# Patient Record
Sex: FEMALE | Race: WHITE
[De-identification: names, ages, dates, MRNs, and addresses within clinical notes are randomized per-mention and may not be internally consistent; named-entity substitution may affect disease eponyms.]

---

## 2019-01-21 ENCOUNTER — HOSPITAL ENCOUNTER (INPATIENT)
Dept: HOSPITAL 10 - L-D | Age: 24
LOS: 3 days | Discharge: HOME | End: 2019-01-24
Attending: OBSTETRICS & GYNECOLOGY | Admitting: OBSTETRICS & GYNECOLOGY
Payer: COMMERCIAL

## 2019-01-21 ENCOUNTER — HOSPITAL ENCOUNTER (INPATIENT)
Dept: HOSPITAL 91 - PP1 | Age: 24
LOS: 3 days | Discharge: HOME | End: 2019-01-24
Payer: COMMERCIAL

## 2019-01-21 DIAGNOSIS — D63.8: ICD-10-CM

## 2019-01-21 DIAGNOSIS — Z3A.40: ICD-10-CM

## 2019-01-21 DIAGNOSIS — O48.0: Primary | ICD-10-CM

## 2019-01-21 LAB
ADD MAN DIFF?: NO
BASOPHIL #: 0.1 10^3/UL (ref 0–0.1)
BASOPHILS %: 0.5 % (ref 0–2)
EOSINOPHILS #: 0.1 10^3/UL (ref 0–0.5)
EOSINOPHILS %: 0.6 % (ref 0–7)
HEMATOCRIT: 33.3 % (ref 37–47)
HEMOGLOBIN: 10.7 G/DL (ref 12–16)
IMMATURE GRANS #M: 0.08 10^3/UL (ref 0–0.03)
IMMATURE GRANS % (M): 0.7 % (ref 0–0.43)
INR: 0.84
LYMPHOCYTES #: 1.9 10^3/UL (ref 0.8–2.9)
LYMPHOCYTES %: 17.1 % (ref 15–51)
MEAN CORPUSCULAR HEMOGLOBIN: 25.9 PG (ref 29–33)
MEAN CORPUSCULAR HGB CONC: 32.1 G/DL (ref 32–37)
MEAN CORPUSCULAR VOLUME: 80.6 FL (ref 82–101)
MEAN PLATELET VOLUME: 11.4 FL (ref 7.4–10.4)
MONOCYTE #: 1 10^3/UL (ref 0.3–0.9)
MONOCYTES %: 9.1 % (ref 0–11)
NEUTROPHIL #: 8 10^3/UL (ref 1.6–7.5)
NEUTROPHILS %: 72 % (ref 39–77)
NUCLEATED RED BLOOD CELLS #: 0 10^3/UL (ref 0–0)
NUCLEATED RED BLOOD CELLS%: 0 /100WBC (ref 0–0)
PARTIAL THROMBOPLASTIN TIME: 25.2 SEC (ref 23–35)
PLATELET COUNT: 306 10^3/UL (ref 140–415)
PROTIME: 11.6 SEC (ref 11.9–14.9)
PT RATIO: 0.9
RAPID PLASMA REAGIN: NONREACTIVE
RED BLOOD COUNT: 4.13 10^6/UL (ref 4.2–5.4)
RED CELL DISTRIBUTION WIDTH: 13.3 % (ref 11.5–14.5)
WHITE BLOOD COUNT: 11.1 10^3/UL (ref 4.8–10.8)

## 2019-01-21 PROCEDURE — 85610 PROTHROMBIN TIME: CPT

## 2019-01-21 PROCEDURE — 90686 IIV4 VACC NO PRSV 0.5 ML IM: CPT

## 2019-01-21 PROCEDURE — 86900 BLOOD TYPING SEROLOGIC ABO: CPT

## 2019-01-21 PROCEDURE — 86901 BLOOD TYPING SEROLOGIC RH(D): CPT

## 2019-01-21 PROCEDURE — 36430 TRANSFUSION BLD/BLD COMPNT: CPT

## 2019-01-21 PROCEDURE — 85730 THROMBOPLASTIN TIME PARTIAL: CPT

## 2019-01-21 PROCEDURE — P9016 RBC LEUKOCYTES REDUCED: HCPCS

## 2019-01-21 PROCEDURE — 86592 SYPHILIS TEST NON-TREP QUAL: CPT

## 2019-01-21 PROCEDURE — 80048 BASIC METABOLIC PNL TOTAL CA: CPT

## 2019-01-21 PROCEDURE — 80307 DRUG TEST PRSMV CHEM ANLYZR: CPT

## 2019-01-21 PROCEDURE — 86850 RBC ANTIBODY SCREEN: CPT

## 2019-01-21 PROCEDURE — 3E033VJ INTRODUCTION OF OTHER HORMONE INTO PERIPHERAL VEIN, PERCUTANEOUS APPROACH: ICD-10-PCS

## 2019-01-21 PROCEDURE — 85025 COMPLETE CBC W/AUTO DIFF WBC: CPT

## 2019-01-21 PROCEDURE — 86920 COMPATIBILITY TEST SPIN: CPT

## 2019-01-21 PROCEDURE — 3E033VJ INTRODUCTION OF OTHER HORMONE INTO PERIPHERAL VEIN, PERCUTANEOUS APPROACH: ICD-10-PCS | Performed by: OBSTETRICS & GYNECOLOGY

## 2019-01-21 RX ADMIN — Medication 1 MLS/HR: at 23:35

## 2019-01-21 RX ADMIN — SODIUM CITRATE AND CITRIC ACID MONOHYDRATE 1 ML: 500; 334 SOLUTION ORAL at 21:30

## 2019-01-21 NOTE — PREOPHP
DATE OF ADMISSION: 2019

 

HISTORY OF PRESENT ILLNESS:  This is a 23-year-old lady,  1, EDC 

2019 at 40 weeks pregnant, admitted in early labor.  She had prenatal care

in my [place] office and the prenatal care was uncomplicated except that she has

a history of mitral valve prolapse and her cardiology cleared her to have 

 section.   She advised her not to go for vaginal delivery and the 

patient wanted to have a  anyway.  So the procedures were explained to 

the patient and she understood everything totally.  The risks, benefits, and 

alternatives were discussed with her as well.

 

PAST PERSONAL HISTORY:  No history of TB, asthma.

 

ALLERGIES:  NO ALLERGIES.

 

SOCIAL HISTORY:  The patient does not smoke.  She does not drink.

 

MEDICATIONS:  She does not take any drugs except her iron and vitamins.

 

GYNECOLOGIC HISTORY:  She had menarche at the age of 11, every 28 days interval,

3 to 4 days duration, and moderate in amount.

 

FAMILY HISTORY:  Noncontributory.

 

REVIEW OF SYSTEMS:

CARDIOVASCULAR:  No chest pains.

RESPIRATORY:  No cough.

GASTROINTESTINAL:  No diarrhea, no vomiting.

GENITOURINARY:  No dysuria.

 

PHYSICAL EXAMINATION:

GENERAL:  Reveals a conscious, coherent lady and in no acute distress.

VITAL SIGNS:  Her blood pressure 120/80, pulse rate 80 per minute, respirations 

16 per minute.

BREASTS, HEART AND LUNGS:  Within normal limits.

ABDOMEN:  Term size uterus, estimated fetal weight about 8 pounds.

PELVIC:  Revealed the cervix to be 2 cm dilated and thick station -3 in cephalic

presentation with the bag of water intact.

EXTREMITIES:  No pedal edema.

 

ADMITTING DIAGNOSIS:  This is a 40 weeks intrauterine pregnancy in early labor. 

The patient was planned to have a .  Procedures were explained to her 

and she understood everything totally.  The risks, benefits and alternatives 

were discussed with her as well.

 

 

Dictated By: RD ALLEN MD

 

NS/NTS

DD:    2019 21:31:00

DT:    2019 22:01:13

Conf#: [id number]

DID#:  [id number]

CC: RD ALLEN MD;*EndCC*

MTDD

## 2019-01-21 NOTE — PREAC
Date/Time of Note


Date/Time of Note


DATE: 1/21/19 


TIME: 21:13





Anesthesia Eval and Record


Evaluation


Time Pre-Procedure Interview


DATE: 1/21/19 


TIME: 21:13


Age


23


Sex


female


NPO:  8 hrs


Preoperative diagnosis


40 week IUP


Planned procedure


c section





Past Medical History


Past Medical History:  Includes


Cardio:  Other (MVP)





Surgery & Anesthesia Issues


No known issue





Meds


Anticoagulation:  No


Beta Blocker within 24 hr:  No


Reason Beta Blocker not given:  Pt. not on B-Blocker





Current Medications


Cefazolin Sodium/ Dextrose 50 ml @  100 mls/hr ONCE IVPB ;  Start 1/21/19 at 


17:00


Oxytocin/Lactated Ringer's 500 ml @  125 mls/hr POST PARTUM IV ;  Start 1/21/19 


at 17:00


Oxytocin/Lactated Ringer's 500 ml @ 0 mls/hr ONCE PRN IV VAGINAL BLEEDING;  


Start 1/21/19 at 17:00


Methylergonovine Maleate (Methergine) 0.2 mg ONCE PRN IM VAGINAL BLEEDING;  


Start 1/21/19 at 17:00


Carboprost Tromethamine (Hemabate) 250 mcg ONCE PRN IM VAGINAL BLEEDING;  Start 


1/21/19 at 17:00


Misoprostol (Cytotec) 1,000 mcg ONCE PRN MI VAGINAL BLEEDING;  Start 1/21/19 at 


17:00


Meds reviewed:  Yes





Allergies


Coded Allergies:  


     No Known Allergies (Verified  Allergy, Unknown, 1/21/19)


Allergies Reviewed:  Yes





Labs/Studies


Labs Reviewed:  Reviewed by anesthesiologist


Result Diagram:  


1/21/19 1715





Laboratory Tests


1/21/19 17:15











Blood Bank


                  Test
                         1/21/19
17:15


                  Antibody Screen               NEGATIVE


                  Blood Type                    A POSITIVE


                  Rh Immune Globulin Candidate  NO





Pregnancy test:  Positive


Studies:  ECG (n/a), CXR (n/a)





Pre-procedure Exam


Airway:  Adequate mouth opening


Mallampati:  Mallampati I


Teeth:  Normal


Lung:  Normal


Heart:  Normal





ASA Physical Status


ASA physical status:  2


Emergency:  None





Planned Anesthetic


Neuraxial:  Spinal





Planned Pain Management


Sub-arachniod narcotics





Pre-operative Attestations


Prior to commencing anesthesia and surgery, the patient was re-evaluated, there 


was verification of:


*The patient's identity


*The results of appropriate recent lab work and preoperative vital signs


*The above evaluation not changing prior to induction


*Anesthetic plan, risk benefits, alternative and complications discussed with 


patient/family; questions answered; patient/family understands, accepts and 


wishes to proceed.











GREG MCDANIELS MD            Jan 21, 2019 21:17

## 2019-01-21 NOTE — PAC
Date/Time of Note


Date/Time of Note


DATE: 1/21/19 


TIME: 23:01





Post-Anesthesia Notes


Post-Anesthesia Note


Last documented vital signs


BP 98/60 HR75, Sat 98%, RR18 temp 98


Activity:  WNL


Respiratory function:  WNL


Cardiovascular function:  WNL


Mental status:  Baseline


Pain reasonably controlled:  Yes


Hydration appropriate:  Yes


Nausea/Vomiting absent:  No











GREG MCDANIELS MD            Jan 21, 2019 23:02

## 2019-01-22 VITALS — SYSTOLIC BLOOD PRESSURE: 123 MMHG | DIASTOLIC BLOOD PRESSURE: 71 MMHG | RESPIRATION RATE: 18 BRPM | HEART RATE: 90 BPM

## 2019-01-22 VITALS — RESPIRATION RATE: 18 BRPM | HEART RATE: 72 BPM | SYSTOLIC BLOOD PRESSURE: 118 MMHG | DIASTOLIC BLOOD PRESSURE: 70 MMHG

## 2019-01-22 VITALS — RESPIRATION RATE: 19 BRPM | HEART RATE: 80 BPM | SYSTOLIC BLOOD PRESSURE: 108 MMHG | DIASTOLIC BLOOD PRESSURE: 65 MMHG

## 2019-01-22 VITALS — SYSTOLIC BLOOD PRESSURE: 115 MMHG | HEART RATE: 71 BPM | RESPIRATION RATE: 18 BRPM | DIASTOLIC BLOOD PRESSURE: 71 MMHG

## 2019-01-22 VITALS — HEART RATE: 76 BPM | DIASTOLIC BLOOD PRESSURE: 73 MMHG | RESPIRATION RATE: 18 BRPM | SYSTOLIC BLOOD PRESSURE: 119 MMHG

## 2019-01-22 VITALS — DIASTOLIC BLOOD PRESSURE: 64 MMHG | RESPIRATION RATE: 18 BRPM | SYSTOLIC BLOOD PRESSURE: 114 MMHG | HEART RATE: 72 BPM

## 2019-01-22 VITALS — SYSTOLIC BLOOD PRESSURE: 116 MMHG | DIASTOLIC BLOOD PRESSURE: 68 MMHG | RESPIRATION RATE: 20 BRPM | HEART RATE: 90 BPM

## 2019-01-22 LAB
ADD MAN DIFF?: NO
ANION GAP: 3 (ref 5–13)
BASOPHIL #: 0.1 10^3/UL (ref 0–0.1)
BASOPHILS %: 0.4 % (ref 0–2)
BLOOD UREA NITROGEN: 9 MG/DL (ref 7–20)
CALCIUM: 8.8 MG/DL (ref 8.4–10.2)
CARBON DIOXIDE: 28 MMOL/L (ref 21–31)
CHLORIDE: 101 MMOL/L (ref 97–110)
CREATININE: 0.61 MG/DL (ref 0.44–1)
EOSINOPHILS #: 0 10^3/UL (ref 0–0.5)
EOSINOPHILS %: 0 % (ref 0–7)
GLUCOSE: 79 MG/DL (ref 70–220)
HEMATOCRIT: 25.8 % (ref 37–47)
HEMOGLOBIN: 8.2 G/DL (ref 12–16)
IMMATURE GRANS #M: 0.07 10^3/UL (ref 0–0.03)
IMMATURE GRANS % (M): 0.5 % (ref 0–0.43)
LYMPHOCYTES #: 1.1 10^3/UL (ref 0.8–2.9)
LYMPHOCYTES %: 7.4 % (ref 15–51)
MEAN CORPUSCULAR HEMOGLOBIN: 26 PG (ref 29–33)
MEAN CORPUSCULAR HGB CONC: 31.8 G/DL (ref 32–37)
MEAN CORPUSCULAR VOLUME: 81.9 FL (ref 82–101)
MEAN PLATELET VOLUME: 11.3 FL (ref 7.4–10.4)
MONOCYTE #: 1 10^3/UL (ref 0.3–0.9)
MONOCYTES %: 7 % (ref 0–11)
NEUTROPHIL #: 12.1 10^3/UL (ref 1.6–7.5)
NEUTROPHILS %: 84.7 % (ref 39–77)
NUCLEATED RED BLOOD CELLS #: 0 10^3/UL (ref 0–0)
NUCLEATED RED BLOOD CELLS%: 0 /100WBC (ref 0–0)
PLATELET COUNT: 242 10^3/UL (ref 140–415)
POTASSIUM: 5.3 MMOL/L (ref 3.5–5.1)
RED BLOOD COUNT: 3.15 10^6/UL (ref 4.2–5.4)
RED CELL DISTRIBUTION WIDTH: 13.4 % (ref 11.5–14.5)
SODIUM: 132 MMOL/L (ref 135–144)
WHITE BLOOD COUNT: 14.2 10^3/UL (ref 4.8–10.8)

## 2019-01-22 RX ADMIN — Medication 1 MLS/HR: at 00:41

## 2019-01-22 RX ADMIN — DOCUSATE SODIUM AND SENNOSIDES SCH TAB: 8.6; 5 TABLET, FILM COATED ORAL at 21:05

## 2019-01-22 RX ADMIN — CEFAZOLIN SODIUM 1 MLS/HR: 2 SOLUTION INTRAVENOUS at 00:44

## 2019-01-22 RX ADMIN — KETOROLAC TROMETHAMINE PRN MG: 30 INJECTION, SOLUTION INTRAMUSCULAR at 21:06

## 2019-01-22 RX ADMIN — DOCUSATE SODIUM AND SENNOSIDES SCH TAB: 8.6; 5 TABLET, FILM COATED ORAL at 08:36

## 2019-01-22 RX ADMIN — KETOROLAC TROMETHAMINE 1 MG: 30 INJECTION, SOLUTION INTRAMUSCULAR at 21:06

## 2019-01-22 RX ADMIN — KETOROLAC TROMETHAMINE PRN MG: 30 INJECTION, SOLUTION INTRAMUSCULAR at 14:48

## 2019-01-22 RX ADMIN — PYRIDOXINE HYDROCHLORIDE 1 MLS/HR: 100 INJECTION, SOLUTION INTRAMUSCULAR; INTRAVENOUS at 00:44

## 2019-01-22 RX ADMIN — KETOROLAC TROMETHAMINE 1 MG: 30 INJECTION, SOLUTION INTRAMUSCULAR at 14:48

## 2019-01-22 RX ADMIN — KETOROLAC TROMETHAMINE PRN MG: 30 INJECTION, SOLUTION INTRAMUSCULAR at 08:36

## 2019-01-22 RX ADMIN — DOCUSATE SODIUM AND SENNOSIDES 1 TAB: 8.6; 5 TABLET, FILM COATED ORAL at 08:36

## 2019-01-22 RX ADMIN — KETOROLAC TROMETHAMINE 1 MG: 30 INJECTION, SOLUTION INTRAMUSCULAR at 08:36

## 2019-01-22 RX ADMIN — ONDANSETRON HYDROCHLORIDE 1 MG: 2 INJECTION, SOLUTION INTRAMUSCULAR; INTRAVENOUS at 02:09

## 2019-01-22 RX ADMIN — PYRIDOXINE HYDROCHLORIDE 1 MLS/HR: 100 INJECTION, SOLUTION INTRAMUSCULAR; INTRAVENOUS at 16:39

## 2019-01-22 RX ADMIN — Medication 1 MLS/HR: at 05:08

## 2019-01-22 RX ADMIN — DOCUSATE SODIUM AND SENNOSIDES 1 TAB: 8.6; 5 TABLET, FILM COATED ORAL at 21:05

## 2019-01-22 NOTE — NUR
EOSS. PT. STABLE . NO RESP. DISTRESS NOTED. NO COMPLAINT OF PAIN NOTED AT THIS TIME. 
BONDING WITH HER BABY. MOVING TOWARDS EXPECTED GIRLS.

## 2019-01-22 NOTE — NUR
PT.RECEIVED FROM LD.ALERT AND ORIENTED. NO COMPLAINT OF PAIN NOTED AT THIS TIME,PT. ORIENTED 
TO ROOM AND ENVIORNMENT. CALL LIGHT AND PHONE AT REACH.

## 2019-01-22 NOTE — NSTRPT
===================================

NST Information

===================================

Datetime Report Generated by CPN: 01/22/2019 10:04

   

   

===========================

Datetime: 01/21/2019 14:09

===========================

   

   

===================================

NST Information

===================================

   

 EGA:  40.0

   

===========================

Datetime: 01/21/2019 13:48

===========================

   

 Test Number:  1

 Time on Monitor:  01/21/2019 14:40

 Time off Monitor:  01/21/2019 15:31

 NST Duration (Min):  51

 Reason for NST:  Other

   Reason for NST Other:  Post Dates

 Test and Monitor Explained:  Monitor Explained; Test Explained; Verbalized Understanding

 Test/Monitor Explained Other:  Dr Burkett reviewed strip/US

 Pulse:  73

 Resp:  18

 SBP:  123

 DBP:  73

   

===================================

Test Evaluation

===================================

   

 NST Interventions:  PO Hydration

   NST Interventions Other:  6oz water given @1500

 Patient States Fetal Movement:  Present

 Contraction Frequency:  2-8/50-90/MILD/pain level 0

 FHR Baseline :  135

 Variability:  Moderate 6-25bpm

 Accelerations:  15X15

 Decelerations:  None

 FHR Category:  Category I

 NST Results:  Reactive





 Comments:  To u/s pedrito 23.3 cm; MVP 8.9 cm; mild polyhydramnious,  cephalic, placenta grade 2 fundal,
 no previa

   EFW 4097 gm, 90% for GA

   AC+greater than 90% 

   Pt states she has a MVP. Went to cardiologist once, but didn't return, because he didn't give her 
the results. Desires PC/S due to the MVP. Had been seeing Dr Pang for Poly, but states was resolved
. OB ultrasound from12/20, Dr Pang, PEDRITO 17.9, efw 2547gms. 

   Dr Matias notified re pt request for CS, mitral valve prolapse, ucs, history, pedrito _ reactive nst-
plan pt to go to the hospital for PCS. Prenatals _ preliminary US report faxed to L/D _ report by NAZANIN hurley rn to AIDA darden RN

   

===================================

Electronically Signed By

===================================

   

 E-Signature:  Electronically signed by Sade Steve MD on 1/22/2019 at 10:03  with User ID: [id number]

## 2019-01-22 NOTE — PN
Date/Time of Note


Date/Time of Note


DATE: 1/22/19 


TIME: 16:01





Assessment/Plan


VTE Prophylaxis


[place] [facility] [place]:  1


SCD applied [place] [facility] [place]  Yes


SCD contraindicated:  low risk/ambulating


Pharmacological prophylaxis:  NA/contraindicated


Pharm contraindication:  low risk/ambulating





Lines/Catheters


IV Catheter Type (from [facility]):  Peripheral IV





Assessment/Plan


Assessment/Plan


POSTCSECTION DAY 1


CHRONIC IRON DEFICIENCY ANEMIA


AS ORDERED


ADVANCE DIET AS TOLERATED


CBC ON 3RD POSTOP DAY


Result Diagram:  


1/22/19 0733                                                                    


           1/22/19 0733





Results 24hrs





Laboratory Tests


Test
               1/21/19
17:15  1/21/19
23:00    1/22/19
07:05  1/22/19
07:33


White Blood Count         11.1  H                                       14.2  #H


Red Blood Count           4.13  L                                       3.15  #L


Hemoglobin                10.7  L                                        8.2  #L


Hematocrit                33.3  L                                       25.8  #L


Mean Corpuscular          80.6  L                                        81.9  L


Volume


Mean Corpuscular          25.9  L                                        26.0  L


Hemoglobin


Mean Corpuscular          32.1  
  
              
                     31.8  L



Hemoglobin
Concent


Red Cell                   13.3                                           13.4


Distribution Width


Platelet Count              306                                           242  #


Mean Platelet             11.4  H                                        11.3  H


Volume


Immature                 0.700  H                                       0.500  H


Granulocytes %


Neutrophils %              72.0                                          84.7  H


Lymphocytes %              17.1                                           7.4  L


Monocytes %                 9.1                                            7.0


Eosinophils %               0.6                                            0.0


Basophils %                 0.5                                            0.4


Nucleated Red               0.0                                            0.0


Blood Cells %


Immature                 0.080  H                                       0.070  H


Granulocytes #


Neutrophils #              8.0  H                                        12.1  H


Lymphocytes #               1.9                                            1.1


Monocytes #                1.0  H                                         1.0  H


Eosinophils #               0.1                                            0.0


Basophils #                 0.1                                            0.1


Nucleated Red               0.0                                            0.0


Blood Cells #


Prothrombin Time          11.6  L


Prothrombin Time            0.9


Ratio


INR International         0.84  
  
              
                



Normalized
Ratio


Activated                 25.2  
  
              
                



Partial
Thrombopla


st Time


Rapid Plasma        NONREACTIVE


Reagin


Urine Opiates                      Negative


Screen


Urine Barbiturates                 Negative


Urine Amphetamines                 Negative


Screen


Urine                              Negative


Benzodiazepines


Screen


Urine Cocaine                      Negative


Screen


Urine Cannabinoids                 Negative


Lab Scanned Report                                REFERENCE LAB


Sodium Level                                                              132  L


Potassium Level                                                           5.3  H


Chloride Level                                                             101


Carbon Dioxide                                                              28


Level


Anion Gap                                                                   3  L


Blood Urea                                                                   9


Nitrogen


Creatinine                                                                0.61


Est Glomerular      
              
              
                > 60  



Filtrat


Rate
mL/min


Glucose Level                                                               79


Calcium Level                                                              8.8








Subjective


24 Hr Interval Summary


Free Text/Dictation


POST CSECTION DAY 1


COMPLAIN OF INCISIONAL PAINS


GOOD URINE OUTPUT


PASSING GAS PER RECTUM 


NO BOWEL MOVEMENT YET





Exam/Review of Systems


Vital Signs


Vitals





Vital Signs


  Date      Temp  Pulse  Resp  B/P (MAP)   Pulse Ox  O2          O2 Flow    FiO2


Time                                                 Delivery    Rate


   1/22/19  98.3     71    18      115/71        97  Room Air


     12:00                           (86)








Intake and Output





1/21/19 1/21/19 1/22/19





1515:00


23:00


07:00





IntakeIntake Total


2525 ml





OutputOutput Total


1320 ml





BalanceBalance


1205 ml














Exam


VITAL SIGNS STABLE: YES


AFEBRILE: YES


BREAST NOT ENGORGED, NON-TENDER, NO APPRECIABLE MASS: YES


LUNGS CLEAR, NO RALES, WHEEZES, RHONCHI: YES


SINUS RHYTHM WITHOUT MURMUR: YES


ABDOMEN: NON-TENDER


FUNDUS: BELOW UMBILICUS


BOWEL SOUNDS: PRESENT


UTERUS: FIRM


INCISION (CLEAN, DRY, AND INTACT): YES


LOCHIA: LIGHT


DEEP TENDON REFLEXES: 0


EXTREMITIES: NO CALF TENDERNESS


EDEMA SCALE: NONE





Medications


Medications





Current Medications


Cefazolin Sodium/ Dextrose 50 ml @  100 mls/hr ONCE IVPB ;  Start 1/21/19 at 


17:00


Oxytocin/Lactated Ringer's 500 ml @  125 mls/hr POST PARTUM IV  Last 


administered on 1/22/19at 00:41; Admin Dose 125 MLS/HR;  Start 1/21/19 at 17:00


Methylergonovine Maleate (Methergine) 0.2 mg Q6H  PRN PO VAGINAL BLEEDING;  


Start 1/21/19 at 23:00


Acetaminophen/ Hydrocodone Bitart (Norco (5/325)) 1 tab Q4H  PRN PO PAIN LEVEL 


4-6;  Start 1/21/19 at 23:00


Acetaminophen/ Hydrocodone Bitart (Norco (5/325)) 2 tab Q4H  PRN PO PAIN LEVEL 


7-10;  Start 1/21/19 at 23:00


Ibuprofen (Motrin) 800 mg Q8H  PRN PO MILD PAIN LEVEL 1-3;  Start 1/21/19 at 


23:00


Simethicone (Mylicon) 160 mg Q8H  PRN PO DISTENSION/GAS/BLOATING;  Start 1/21/19


at 23:00


Senna/Docusate Sodium (Senokot-S) 1 tab BID PO  Last administered on 1/22/19at 


08:36; Admin Dose 1 TAB;  Start 1/22/19 at 09:00


Lanolin (Lanolin Hpa) 1 applic BEDSIDE MEDICATION  PRN TOP BEDSIDE FOR DONOVAN TO 


NIPPLES;  Start 1/21/19 at 23:00


Diphtheria/ Tetanus/Acell Pertussis (Adacel) 0.5 ml ONCE ONCE IM* ;  Start 


1/24/19 at 09:00;  Stop 1/24/19 at 09:01


Measles/Mumps/ Rubella Vaccine Live (Mmr Ii Vaccine) 0.5 ml ONCE ONCE SC* ;  


Start 1/24/19 at 09:00;  Stop 1/24/19 at 09:01


Oxytocin/Lactated Ringer's 500 ml @ 0 mls/hr ONCE PRN IV VAGINAL BLEEDING;  


Start 1/21/19 at 23:00


Methylergonovine Maleate (Methergine) 0.2 mg ONCE PRN IM VAGINAL BLEEDING;  


Start 1/21/19 at 23:00


Carboprost Tromethamine (Hemabate) 250 mcg ONCE PRN IM VAGINAL BLEEDING;  Start 


1/21/19 at 23:00


Misoprostol (Cytotec) 1,000 mcg ONCE PRN AK VAGINAL BLEEDING;  Start 1/21/19 at 


23:00


Naloxone HCl (Narcan) 0.1 mg Q2M  PRN IV DECREASED REPIRATORY RATE;  Start 


1/21/19 at 23:00;  Stop 1/22/19 at 22:59


Ketorolac Tromethamine (Toradol) 30 mg Q6H  PRN IV PAIN Last administered on 


1/22/19at 14:48; Admin Dose 30 MG;  Start 1/21/19 at 23:00;  Stop 1/22/19 at 


22:59


Morphine Sulfate (morphine) 3 mg Q3  PRN IV BREAKTHROUGH PAIN;  Start 1/21/19 at


23:00;  Stop 1/22/19 at 22:59


Morphine Sulfate (morphine) 2 mg Q3H  PRN IV PAIN LEVEL 1-5;  Start 1/21/19 at 


23:00;  Stop 1/22/19 at 22:59


Morphine Sulfate (morphine) 4 mg Q3H  PRN IV PAIN LEVEL 6-10;  Start 1/21/19 at 


23:00;  Stop 1/22/19 at 22:59


Diphenhydramine HCl (Benadryl) 25 mg Q6H  PRN IV ITCHING;  Start 1/21/19 at 


23:00;  Stop 1/22/19 at 22:59


Ondansetron HCl (Zofran Inj) 4 mg Q6H  PRN IV NAUSEA AND/OR VOMITING;  Start 


1/21/19 at 23:00;  Stop 1/22/19 at 22:59


Lactated Ringer's 1,000 ml @  125 mls/hr Q8H ONCE IV ;  Start 1/22/19 at 16:00; 


Stop 1/22/19 at 23:59











RD ALLEN MD             Jan 22, 2019 16:02

## 2019-01-22 NOTE — OPPN
Date/Time of Note


Date/Time of Note


DATE: 1/22/19 


TIME: 10:33





Operative Report


Planned Procedure


Procedure date


Jan 21, 2019 22:14


Procedure(s)


PRIMARY CSECTION


Performed by


see signature line


Assistant:  RENETTA CROCKER


2nd Assistant


none


Pre-procedure diagnosis


40 WEEKS IUP WITH MVP


                 [id number]
Anesthesia Type:  [id number]
spinal








Post-Procedure


Post-procedure diagnosis


40 WEEKS IUP WITH MVP


Findings


Live Baby BOY  Apgars 9 and 9 [id number] 7OZ 19 INCHES


Estimated Blood Loss:  500 - 600 mls


Specimen(s)


none


Grafts/Implant(s)


PLACENTA


Complication(s)


none











RD ALLEN MD             Jan 22, 2019 10:35

## 2019-01-22 NOTE — OPPN
Date/Time of Note


Date/Time of Note


DATE: 1/22/19 


TIME: 18:32





Anesthesia Follow up


Anesthesia Follow up


Last documented vital signs





Vital Signs


  Date      Temp  Pulse  Resp  B/P (MAP)   Pulse Ox  O2          O2 Flow    FiO2


Time                                                 Delivery    Rate


   1/22/19  98.9     80    19      108/65        97  Room Air


     16:00                           (79)





Respiratory function:  WNL


Cardiovascular function:  WNL


Comments


A 23 year old female s/p spinal duramorph for post op pain POD#1. No pain, 


itching, N/V, headache. no neural deficit%











GREG MCDANIELS MD            Jan 22, 2019 18:34

## 2019-01-23 VITALS — HEART RATE: 74 BPM | RESPIRATION RATE: 20 BRPM | DIASTOLIC BLOOD PRESSURE: 64 MMHG | SYSTOLIC BLOOD PRESSURE: 120 MMHG

## 2019-01-23 VITALS — HEART RATE: 85 BPM | RESPIRATION RATE: 18 BRPM | DIASTOLIC BLOOD PRESSURE: 64 MMHG | SYSTOLIC BLOOD PRESSURE: 119 MMHG

## 2019-01-23 VITALS — HEART RATE: 82 BPM | DIASTOLIC BLOOD PRESSURE: 57 MMHG | SYSTOLIC BLOOD PRESSURE: 106 MMHG | RESPIRATION RATE: 16 BRPM

## 2019-01-23 VITALS — RESPIRATION RATE: 18 BRPM | DIASTOLIC BLOOD PRESSURE: 64 MMHG | HEART RATE: 85 BPM | SYSTOLIC BLOOD PRESSURE: 119 MMHG

## 2019-01-23 RX ADMIN — IBUPROFEN PRN MG: 800 TABLET ORAL at 04:50

## 2019-01-23 RX ADMIN — IBUPROFEN 1 MG: 800 TABLET, FILM COATED ORAL at 15:21

## 2019-01-23 RX ADMIN — IBUPROFEN PRN MG: 800 TABLET ORAL at 15:21

## 2019-01-23 RX ADMIN — IBUPROFEN 1 MG: 800 TABLET, FILM COATED ORAL at 04:50

## 2019-01-23 RX ADMIN — HYDROCODONE BITARTRATE AND ACETAMINOPHEN 1 TAB: 5; 325 TABLET ORAL at 19:43

## 2019-01-23 RX ADMIN — DOCUSATE SODIUM AND SENNOSIDES SCH TAB: 8.6; 5 TABLET, FILM COATED ORAL at 21:46

## 2019-01-23 RX ADMIN — Medication 1 APPLIC: at 15:21

## 2019-01-23 RX ADMIN — HYDROCODONE BITARTRATE AND ACETAMINOPHEN 1 TAB: 5; 325 TABLET ORAL at 12:12

## 2019-01-23 RX ADMIN — HYDROCODONE BITARTRATE AND ACETAMINOPHEN PRN TAB: 5; 325 TABLET ORAL at 02:28

## 2019-01-23 RX ADMIN — MAGNESIUM HYDROXIDE 1 ML: 400 SUSPENSION ORAL at 11:00

## 2019-01-23 RX ADMIN — DOCUSATE SODIUM AND SENNOSIDES 1 TAB: 8.6; 5 TABLET, FILM COATED ORAL at 21:46

## 2019-01-23 RX ADMIN — HYDROCODONE BITARTRATE AND ACETAMINOPHEN PRN TAB: 5; 325 TABLET ORAL at 19:43

## 2019-01-23 RX ADMIN — DOCUSATE SODIUM AND SENNOSIDES 1 TAB: 8.6; 5 TABLET, FILM COATED ORAL at 11:00

## 2019-01-23 RX ADMIN — HYDROCODONE BITARTRATE AND ACETAMINOPHEN PRN TAB: 5; 325 TABLET ORAL at 12:12

## 2019-01-23 RX ADMIN — HYDROCODONE BITARTRATE AND ACETAMINOPHEN 1 TAB: 5; 325 TABLET ORAL at 02:28

## 2019-01-23 RX ADMIN — DOCUSATE SODIUM AND SENNOSIDES SCH TAB: 8.6; 5 TABLET, FILM COATED ORAL at 11:00

## 2019-01-23 RX ADMIN — Medication 1 MG: at 11:00

## 2019-01-23 NOTE — NUR
JOSE NOTE:  CONSULT - HX OF MARIJUANA USE - NEGATIVE AT [facility]



This writer reviewed the pt's chart and met with her, the FoB, Sanjiv Armendariz, :  
1995 Ph:  [phone], and her Maternal Grandmother, Ros Oliveira, Ph:  [phone], 
at bedside.  Pt had a bright affect.  She was receptive.



Pt is G-1 and P-1.  EDC was on 19.  t and her adult siblings live with the maternal 
grandmother, Ros Oliveira, Ph:  [phone].  The FoB lives with the pt'  He is employed 
and works at a car dealership.  Pt stated PNC in October.  Her UDS was positive for 
marijuana at the clinic but the pt stated she did not know she was pregnant and she quit 
immediately.  Pt's UDS is negative at [facility].  Pt stated she has a car seat and a crib for the 
baby.  Pt plans to apply for WIC and has the WIC office info.  She plans to breastfeed.  Pt 
stated she has a hx of anxiety and depression and has been seeing a therapist at the 
[facility].  Therapist is Belen and the pt has an appointment on 
19.  



SW provided supportive intervention.  Discussed [facility] and WIC for the baby.  Discussed DC 
planning and anticipated discharge date.  Pt stated the FoB will transport home at 
discharge.  Grandmother acknowledged the pt/baby  may return to her home address.    No 
other known issues.  baby may be discharged to his parents.  RN to provide the DC 
instructions.   

-------------------------------------------------------------------------------

Addendum: 19 at 1633 by DUSTIN TIAN LCSW

-------------------------------------------------------------------------------

Amended: Links added.

## 2019-01-23 NOTE — NUR
eoss: vss, voiding and passing gas, had a formed bm earlier in shift, bonding well with baby 
and breastfeeding often, showered, abd incision intact with steri strips and open to air 
with abd binder on, motrin and norco taken for pain with relief per patient, fundus firm and 
lochia scant, seen earlier by dr medina, amb with steady even gait, asiya po food and fluids 
without difficulty.

## 2019-01-24 VITALS — HEART RATE: 62 BPM | SYSTOLIC BLOOD PRESSURE: 125 MMHG | DIASTOLIC BLOOD PRESSURE: 73 MMHG | RESPIRATION RATE: 17 BRPM

## 2019-01-24 VITALS — RESPIRATION RATE: 16 BRPM | DIASTOLIC BLOOD PRESSURE: 57 MMHG | SYSTOLIC BLOOD PRESSURE: 108 MMHG | HEART RATE: 74 BPM

## 2019-01-24 VITALS — RESPIRATION RATE: 19 BRPM | DIASTOLIC BLOOD PRESSURE: 58 MMHG | HEART RATE: 84 BPM | SYSTOLIC BLOOD PRESSURE: 113 MMHG

## 2019-01-24 VITALS — DIASTOLIC BLOOD PRESSURE: 69 MMHG | RESPIRATION RATE: 16 BRPM | HEART RATE: 60 BPM | SYSTOLIC BLOOD PRESSURE: 118 MMHG

## 2019-01-24 VITALS — RESPIRATION RATE: 16 BRPM | DIASTOLIC BLOOD PRESSURE: 69 MMHG | HEART RATE: 65 BPM | SYSTOLIC BLOOD PRESSURE: 117 MMHG

## 2019-01-24 VITALS — SYSTOLIC BLOOD PRESSURE: 118 MMHG | DIASTOLIC BLOOD PRESSURE: 77 MMHG | RESPIRATION RATE: 16 BRPM | HEART RATE: 78 BPM

## 2019-01-24 VITALS — HEART RATE: 79 BPM | RESPIRATION RATE: 17 BRPM | SYSTOLIC BLOOD PRESSURE: 128 MMHG | DIASTOLIC BLOOD PRESSURE: 70 MMHG

## 2019-01-24 VITALS — DIASTOLIC BLOOD PRESSURE: 75 MMHG | SYSTOLIC BLOOD PRESSURE: 121 MMHG | RESPIRATION RATE: 18 BRPM | HEART RATE: 72 BPM

## 2019-01-24 VITALS — DIASTOLIC BLOOD PRESSURE: 67 MMHG | SYSTOLIC BLOOD PRESSURE: 115 MMHG | HEART RATE: 85 BPM | RESPIRATION RATE: 16 BRPM

## 2019-01-24 VITALS — SYSTOLIC BLOOD PRESSURE: 118 MMHG | HEART RATE: 88 BPM | DIASTOLIC BLOOD PRESSURE: 72 MMHG | RESPIRATION RATE: 14 BRPM

## 2019-01-24 VITALS — SYSTOLIC BLOOD PRESSURE: 120 MMHG | RESPIRATION RATE: 20 BRPM | HEART RATE: 74 BPM | DIASTOLIC BLOOD PRESSURE: 62 MMHG

## 2019-01-24 VITALS — HEART RATE: 62 BPM | SYSTOLIC BLOOD PRESSURE: 119 MMHG | DIASTOLIC BLOOD PRESSURE: 60 MMHG | RESPIRATION RATE: 17 BRPM

## 2019-01-24 VITALS — RESPIRATION RATE: 18 BRPM | SYSTOLIC BLOOD PRESSURE: 118 MMHG | HEART RATE: 68 BPM | DIASTOLIC BLOOD PRESSURE: 68 MMHG

## 2019-01-24 VITALS — HEART RATE: 84 BPM | SYSTOLIC BLOOD PRESSURE: 125 MMHG | DIASTOLIC BLOOD PRESSURE: 62 MMHG | RESPIRATION RATE: 16 BRPM

## 2019-01-24 VITALS — HEART RATE: 79 BPM | SYSTOLIC BLOOD PRESSURE: 128 MMHG | DIASTOLIC BLOOD PRESSURE: 70 MMHG | RESPIRATION RATE: 17 BRPM

## 2019-01-24 VITALS — HEART RATE: 18 BPM | DIASTOLIC BLOOD PRESSURE: 68 MMHG | RESPIRATION RATE: 18 BRPM | SYSTOLIC BLOOD PRESSURE: 118 MMHG

## 2019-01-24 LAB
ABNORMAL IP MESSAGE: 1
ADD MAN DIFF?: NO
ADD MAN DIFF?: NO
BASOPHIL #: 0 10^3/UL (ref 0–0.1)
BASOPHIL #: 0.1 10^3/UL (ref 0–0.1)
BASOPHILS %: 0.3 % (ref 0–2)
BASOPHILS %: 0.5 % (ref 0–2)
EOSINOPHILS #: 0.1 10^3/UL (ref 0–0.5)
EOSINOPHILS #: 0.2 10^3/UL (ref 0–0.5)
EOSINOPHILS %: 1 % (ref 0–7)
EOSINOPHILS %: 1.7 % (ref 0–7)
HEMATOCRIT: 22.1 % (ref 37–47)
HEMATOCRIT: 30.1 % (ref 37–47)
HEMOGLOBIN: 6.9 G/DL (ref 12–16)
HEMOGLOBIN: 9.8 G/DL (ref 12–16)
IMMATURE GRANS #M: 0.07 10^3/UL (ref 0–0.03)
IMMATURE GRANS #M: 0.09 10^3/UL (ref 0–0.03)
IMMATURE GRANS % (M): 0.6 % (ref 0–0.43)
IMMATURE GRANS % (M): 0.8 % (ref 0–0.43)
IMMEDIATE SPIN CROSSMATCH: 1 2
LYMPHOCYTES #: 1.6 10^3/UL (ref 0.8–2.9)
LYMPHOCYTES #: 1.8 10^3/UL (ref 0.8–2.9)
LYMPHOCYTES %: 13.5 % (ref 15–51)
LYMPHOCYTES %: 16 % (ref 15–51)
MEAN CORPUSCULAR HEMOGLOBIN: 26 PG (ref 29–33)
MEAN CORPUSCULAR HEMOGLOBIN: 27.4 PG (ref 29–33)
MEAN CORPUSCULAR HGB CONC: 31.2 G/DL (ref 32–37)
MEAN CORPUSCULAR HGB CONC: 32.6 G/DL (ref 32–37)
MEAN CORPUSCULAR VOLUME: 83.4 FL (ref 82–101)
MEAN CORPUSCULAR VOLUME: 84.1 FL (ref 82–101)
MEAN PLATELET VOLUME: 10.5 FL (ref 7.4–10.4)
MEAN PLATELET VOLUME: 9.8 FL (ref 7.4–10.4)
MONOCYTE #: 0.8 10^3/UL (ref 0.3–0.9)
MONOCYTE #: 0.9 10^3/UL (ref 0.3–0.9)
MONOCYTES %: 7 % (ref 0–11)
MONOCYTES %: 7.9 % (ref 0–11)
NEUTROPHIL #: 8.5 10^3/UL (ref 1.6–7.5)
NEUTROPHIL #: 8.9 10^3/UL (ref 1.6–7.5)
NEUTROPHILS %: 74.2 % (ref 39–77)
NEUTROPHILS %: 76.5 % (ref 39–77)
NUCLEATED RED BLOOD CELLS #: 0 10^3/UL (ref 0–0)
NUCLEATED RED BLOOD CELLS #: 0 10^3/UL (ref 0–0)
NUCLEATED RED BLOOD CELLS%: 0 /100WBC (ref 0–0)
NUCLEATED RED BLOOD CELLS%: 0 /100WBC (ref 0–0)
PLATELET COUNT: 269 10^3/UL (ref 140–415)
PLATELET COUNT: 280 10^3/UL (ref 140–415)
POSITIVE DIFF: (no result)
RED BLOOD COUNT: 2.65 10^6/UL (ref 4.2–5.4)
RED BLOOD COUNT: 3.58 10^6/UL (ref 4.2–5.4)
RED CELL DISTRIBUTION WIDTH: 14.1 % (ref 11.5–14.5)
RED CELL DISTRIBUTION WIDTH: 14.7 % (ref 11.5–14.5)
WHITE BLOOD COUNT: 11.5 10^3/UL (ref 4.8–10.8)
WHITE BLOOD COUNT: 11.6 10^3/UL (ref 4.8–10.8)

## 2019-01-24 RX ADMIN — DOCUSATE SODIUM AND SENNOSIDES SCH TAB: 8.6; 5 TABLET, FILM COATED ORAL at 09:23

## 2019-01-24 RX ADMIN — DOCUSATE SODIUM AND SENNOSIDES 1 TAB: 8.6; 5 TABLET, FILM COATED ORAL at 09:23

## 2019-01-24 RX ADMIN — DOCUSATE SODIUM AND SENNOSIDES 1 TAB: 8.6; 5 TABLET, FILM COATED ORAL at 20:24

## 2019-01-24 RX ADMIN — CLOSTRIDIUM TETANI TOXOID ANTIGEN (FORMALDEHYDE INACTIVATED), CORYNEBACTERIUM DIPHTHERIAE TOXOID ANTIGEN (FORMALDEHYDE INACTIVATED), BORDETELLA PERTUSSIS TOXOID ANTIGEN (GLUTARALDEHYDE INACTIVATED), BORDETELLA PERTUSSIS FILAMENTOUS HEMAGGLUTININ ANTIGEN (FORMALDEHYDE INACTIVATED), BORDETELLA PERTUSSIS PERTACTIN ANTIGEN, AND BORDETELLA PERTUSSIS FIMBRIAE 2/3 ANTIGEN 1 ML: 5; 2; 2.5; 5; 3; 5 INJECTION, SUSPENSION INTRAMUSCULAR at 09:00

## 2019-01-24 RX ADMIN — HYDROCODONE BITARTRATE AND ACETAMINOPHEN PRN TAB: 5; 325 TABLET ORAL at 20:42

## 2019-01-24 RX ADMIN — MEASLES, MUMPS, AND RUBELLA VIRUS VACCINE LIVE 1 ML: 1000; 12500; 1000 INJECTION, POWDER, LYOPHILIZED, FOR SUSPENSION SUBCUTANEOUS at 09:23

## 2019-01-24 RX ADMIN — THIAMINE HYDROCHLORIDE 1 MLS/HR: 100 INJECTION, SOLUTION INTRAMUSCULAR; INTRAVENOUS at 11:38

## 2019-01-24 RX ADMIN — IBUPROFEN PRN MG: 800 TABLET ORAL at 11:39

## 2019-01-24 RX ADMIN — HYDROCODONE BITARTRATE AND ACETAMINOPHEN PRN TAB: 5; 325 TABLET ORAL at 04:30

## 2019-01-24 RX ADMIN — IBUPROFEN 1 MG: 800 TABLET, FILM COATED ORAL at 11:39

## 2019-01-24 RX ADMIN — IBUPROFEN PRN MG: 800 TABLET ORAL at 20:24

## 2019-01-24 RX ADMIN — IBUPROFEN 1 MG: 800 TABLET, FILM COATED ORAL at 20:24

## 2019-01-24 RX ADMIN — HYDROCODONE BITARTRATE AND ACETAMINOPHEN 1 TAB: 5; 325 TABLET ORAL at 04:30

## 2019-01-24 RX ADMIN — DOCUSATE SODIUM AND SENNOSIDES SCH TAB: 8.6; 5 TABLET, FILM COATED ORAL at 20:24

## 2019-01-24 RX ADMIN — HYDROCODONE BITARTRATE AND ACETAMINOPHEN 1 TAB: 5; 325 TABLET ORAL at 20:42

## 2019-01-24 NOTE — NUR
Spoke with Dr. Matias regarding today's cbc results. Orders received for 2 units of PRBC. 
Dr. Mtaias spoke with Patient on the phone. Patient agreed to blood transfusion.

-------------------------------------------------------------------------------

Addendum: 01/24/19 at 1155 by JIMMY PERSON RN

-------------------------------------------------------------------------------

Amended: Links added.

## 2019-01-24 NOTE — NUR
EOSS; Patient's vital signs stable. received 2 units of PRBC as per orders. Had cbc drawn at 
1800 waiting for results and then will call Dr. Matias to report results.

Anticipating discharge home tonight. 

Teaching done. All questions answered.

## 2019-01-24 NOTE — PN
Date/Time of Note


Date/Time of Note


DATE: 1/23/19 


TIME: 09:00





Assessment/Plan


VTE Prophylaxis


Risk score (from Nsg)>0 risk:  1


SCD applied (from Nsg):  No


SCD contraindicated:  low risk/ambulating


Pharmacological prophylaxis:  NA/contraindicated


Pharm contraindication:  low risk/ambulating





Lines/Catheters


IV Catheter Type (from Nrsg):  Peripheral IV





Assessment/Plan


Assessment/Plan


POST CSECTION DAY 2


CHRONIC IRON DEFICIENCY ANEMIA


HOME TOMORROW 


CBC TOMORROW


COUNSELED


INSTRUCTED


PRESCRIPTION GIVEN FOR PAIN


RETURN TO CLINIC IN 2 WEEKS


CALL OFFICE IF THERE IS ANY PROBLEM OR CONCERN


CONTINUE WITH VITAMINS OD AND FERROUS SULFATE 325MG PO TID


DIET AS ADVISED


Result Diagram:  


1/24/19 0639                                                                    


           1/22/19 0733





Results 24hrs





Laboratory Tests


               Test
                                1/24/19
06:39


               White Blood Count                          11.6  H


               Red Blood Count                            2.65  L


               Hemoglobin                                 6.9  *L


               Hematocrit                                 22.1  L


               Mean Corpuscular Volume                     83.4


               Mean Corpuscular Hemoglobin                26.0  L


               Mean Corpuscular Hemoglobin
Concent       31.2  L



               Red Cell Distribution Width                 14.1


               Platelet Count                               269


               Mean Platelet Volume                       10.5  H


               Immature Granulocytes %                   0.800  H


               Neutrophils %                               76.5


               Lymphocytes %                              13.5  L


               Monocytes %                                  7.9


               Eosinophils %                                1.0


               Basophils %                                  0.3


               Nucleated Red Blood Cells %                  0.0


               Immature Granulocytes #                   0.090  H


               Neutrophils #                               8.9  H


               Lymphocytes #                                1.6


               Monocytes #                                  0.9


               Eosinophils #                                0.1


               Basophils #                                  0.0


               Nucleated Red Blood Cells #                  0.0








Subjective


24 Hr Interval Summary


Free Text/Dictation


POST CSECTION DAY 2


LITTLE BOWEL MOVEMENT


GOOD URINE OUTPUT


FEELS LESS INCISIONAL PAINS





Exam/Review of Systems


Vital Signs


Vitals





Vital Signs


  Date      Temp  Pulse  Resp  B/P (MAP)   Pulse Ox  O2          O2 Flow    FiO2


Time                                                 Delivery    Rate


   1/24/19  98.1     74    20      120/62            Room Air


     05:04                           (81)


   1/22/19                                       99


     20:00








Exam


VITAL SIGNS STABLE: YES


AFEBRILE: YES


BREAST NOT ENGORGED, NON-TENDER, NO APPRECIABLE MASS: YES


LUNGS CLEAR, NO RALES, WHEEZES, RHONCHI: YES


SINUS RHYTHM WITHOUT MURMUR: YES


ABDOMEN: NON-TENDER


FUNDUS: BELOW UMBILICUS


BOWEL SOUNDS: PRESENT


UTERUS: FIRM


INCISION (CLEAN, DRY, AND INTACT): YES


LOCHIA: LIGHT


DEEP TENDON REFLEXES: 0


EXTREMITIES: NO CALF TENDERNESS


EDEMA SCALE: NONE





Medications


Medications





Current Medications


Cefazolin Sodium/ Dextrose 50 ml @  100 mls/hr ONCE IVPB ;  Start 1/21/19 at 


17:00


Oxytocin/Lactated Ringer's 500 ml @  125 mls/hr POST PARTUM IV  Last 


administered on 1/22/19at 00:41; Admin Dose 125 MLS/HR;  Start 1/21/19 at 17:00


Methylergonovine Maleate (Methergine) 0.2 mg Q6H  PRN PO VAGINAL BLEEDING;  


Start 1/21/19 at 23:00


Acetaminophen/ Hydrocodone Bitart (Norco (5/325)) 1 tab Q4H  PRN PO PAIN LEVEL 


4-6;  Start 1/21/19 at 23:00


Acetaminophen/ Hydrocodone Bitart (Norco (5/325)) 2 tab Q4H  PRN PO PAIN LEVEL 


7-10 Last administered on 1/24/19at 04:30; Admin Dose 2 TAB;  Start 1/21/19 at 


23:00


Ibuprofen (Motrin) 800 mg Q8H  PRN PO MILD PAIN LEVEL 1-3 Last administered on 


1/23/19at 15:21; Admin Dose 800 MG;  Start 1/21/19 at 23:00


Simethicone (Mylicon) 160 mg Q8H  PRN PO DISTENSION/GAS/BLOATING Last administer


ed on 1/23/19at 21:46; Admin Dose 160 MG;  Start 1/21/19 at 23:00


Senna/Docusate Sodium (Senokot-S) 1 tab BID PO  Last administered on 1/24/19at 


09:23; Admin Dose 1 TAB;  Start 1/22/19 at 09:00


Lanolin (Lanolin Hpa) 1 applic BEDSIDE MEDICATION  PRN TOP BEDSIDE FOR DONOVAN TO 


NIPPLES Last administered on 1/23/19at 15:21; Admin Dose 1 APPLIC;  Start 


1/21/19 at 23:00


Oxytocin/Lactated Ringer's 500 ml @ 0 mls/hr ONCE PRN IV VAGINAL BLEEDING;  


Start 1/21/19 at 23:00


Methylergonovine Maleate (Methergine) 0.2 mg ONCE PRN IM VAGINAL BLEEDING;  


Start 1/21/19 at 23:00


Carboprost Tromethamine (Hemabate) 250 mcg ONCE PRN IM VAGINAL BLEEDING;  Start 


1/21/19 at 23:00


Misoprostol (Cytotec) 1,000 mcg ONCE PRN MA VAGINAL BLEEDING;  Start 1/21/19 at 


23:00


Magnesium Hydroxide (Milk Of Mag) 30 ml DAILY  PRN PO CONSTIPATION Last 


administered on 1/23/19at 11:00; Admin Dose 30 ML;  Start 1/23/19 at 09:30











RD ALLEN MD             Jan 24, 2019 09:27

## 2019-01-24 NOTE — NUR
Discharged home accompanied by  via wheelchair in stable condition, discharged 
instructions given and verbalized understanding.

## 2019-01-24 NOTE — OPR
DATE OF OPERATION:  2019

 

 

PREOPERATIVE DIAGNOSIS:  40 weeks intrauterine pregnancy, mitral valve prolapse.  The patient desires
 .

 

POSTOPERATIVE DIAGNOSIS:  40 weeks intrauterine pregnancy, mitral valve prolapse.  The patient desire
s .

 

OPERATION PERFORMED:  Primary low transverse  section.

 

SURGEON:  Rd Matias MD

 

ASSISTANT:  Sean Avina M.D.

 

ANESTHESIA:  Spinal.

 

ANESTHESIOLOGIST:  Dr. Ho.

 

OPERATIVE TECHNIQUE:  Under spinal anesthesia, the patient was prepped and draped in the usual fashio
n for abdominal surgery.  After checking for the effect of the anesthesia, a Pfannenstiel incision, 1
0 cm skin incision was performed.  The incision was carried from the skin up to the fascia.  Upon ope
alphonse the skin up to the fascia, small blood vessels were noted to be oozing and these were all cauter
ized.  Fascia was opened transversely followed by splitting the muscles vertical and the peritoneum v
ertically.  Upon opening the abdominal cavity, the bladder blade was put in place.  A small nick was 
performed from the serosa up to the endometrium, and the nick was carried sideways with the aid of my
 2 fingers.  My left hand was inserted in the lower segment of the uterus and the bag of water was ru
ptured.  Clear fluid was noted.  Baby's head was delivered.  Baby's airway was quickly suctioned of a
mniotic fluid.  Then, the anterior shoulder, posterior shoulder, and rest of the body of the baby was
 delivered.  After 30 seconds, the baby's cord was clamped and the baby was handed to the NICU team. 
 The placenta was delivered manually and complete.  The uterus was exteriorized.  The uterus was selena
nsed with wet lap sponge to make sure that no fetal membranes were left behind.  After correct sponge
 count, the uterus was closed in the usual fashion using #1 chromic for the first layer, continuous l
ocking suture was used followed by #1 chromic for the second layer, imbricating sutures were used.  B
leeders were checked, and there was no bleeding noted.  After checking for any bleeders in which ther
e were none, both tubes and ovaries were inspected.  They were healthy looking.  The back of the uter
us was checked for any hematoma in the broad ligament and there was no hematoma noted.  The uterus wa
s put back to the pelvic cavity.  Once again, uterine incision was checked for any bleeders and there
 was no bleeding noted.  After correct sponge count, needle count and instrument count as confirmed b
y the scrub tech and circulator, the abdomen was closed in the usual fashion using 0 Vicryl for the p
eritoneum, 0 Vicryl for the muscles, for the fascia 0 Vicryl continuous suture was used followed by f
ew figure-of-eight suture for the subcutaneous tissue, it was closed with 3-0 Vicryl and the skin was
 closed with 3-0 Vicryl, subcuticular suture was used.  The patient tolerated the procedure well.  Es
timated blood loss about 600 mL.  Vital signs were stable during and after the procedure.  She delive
red a healthy baby boy, Apgar's 9 and 9 on 2009 weighing 8 pounds 7 ounces, 3835 grams and 19 i
nches long.

 

 

Dictated By: RD MATIAS MD

 

NS/NTS

DD:    2019 04:57:33

DT:    2019 07:30:36

Conf#: [id number]

DID#:  [id number]

CC: RD MATIAS MD;*EndCC*

## 2019-02-04 NOTE — DS
DATE OF ADMISSION: 2019

DATE OF DISCHARGE: 2019

 

SUBJECTIVE:  This is a 23-year-old lady,  1, EDC 2019, and 40 weeks pregnant, admitted f
or primary  due to mitral valve prolapse and per cardiology recommendations, she needs to ha
ve .

 

HISTORY OF PRESENT ILLNESS:  See dictated history and physical.

 

PHYSICAL EXAMINATION:  See dictated history and physical.

 

ADMITTING DIAGNOSIS:  40 weeks intrauterine pregnancy in early labor with mitral valve prolapse.

 

HOSPITAL COURSE:  The patient underwent a primary low transverse  section under spinal anesth
esia.  She tolerated the procedure well.  This was done on 2019.  She tolerated the procedure w
ell.  She did have good postoperative course.  The diet was advanced from liquid to general diet.  Juan buckley had good bowel movement postoperatively.  She has less pain on the third postoperative day.  She wa
s discharged home in good and stable condition.  General diet and activity was restricted.  She was c
ounseled.  She was instructed.  She was told to come back to the clinic in 2 weeks.  She was given pr
escription for pain.  The hematocrit on discharge was 30.1, hemoglobin 9.8.

 

FINAL DIAGNOSES:

1.  Chronic iron deficiency anemia.

2.  40 weeks intrauterine pregnancy in early labor and delivered.

3.  Mitral valve prolapse.

 

 

Dictated By: RD ALLEN MD

 

NS/NTS

DD:    2019 06:16:47

DT:    2019 08:56:39

Conf#: [id number]

DID#:  [id number]

CC: RD ALLEN MD;*EndCC*